# Patient Record
Sex: FEMALE
[De-identification: names, ages, dates, MRNs, and addresses within clinical notes are randomized per-mention and may not be internally consistent; named-entity substitution may affect disease eponyms.]

---

## 2024-06-07 ENCOUNTER — APPOINTMENT (OUTPATIENT)
Dept: ORTHOPEDIC SURGERY | Facility: CLINIC | Age: 29
End: 2024-06-07
Payer: MEDICAID

## 2024-06-07 VITALS — WEIGHT: 140 LBS | BODY MASS INDEX: 21.97 KG/M2 | HEIGHT: 67 IN

## 2024-06-07 DIAGNOSIS — S92.515A NONDISPLACED FRACTURE OF PROXIMAL PHALANX OF LEFT LESSER TOE(S), INITIAL ENCOUNTER FOR CLOSED FRACTURE: ICD-10-CM

## 2024-06-07 PROBLEM — Z00.00 ENCOUNTER FOR PREVENTIVE HEALTH EXAMINATION: Status: ACTIVE | Noted: 2024-06-07

## 2024-06-07 PROCEDURE — 28510 TREATMENT OF TOE FRACTURE: CPT | Mod: LT

## 2024-06-07 PROCEDURE — 99203 OFFICE O/P NEW LOW 30 MIN: CPT | Mod: 57

## 2024-06-07 NOTE — PHYSICAL EXAM
[de-identified] : Fifth toe is tender.  It is swollen.  No significant deformity.  Neurovascular intact distally.

## 2024-06-07 NOTE — DATA REVIEWED
[FreeTextEntry1] : I reviewed the patient's x-rays that were taken at [facility].  X-rays demonstrate a nondisplaced oblique fracture of the fifth proximal phalanx.

## 2024-06-07 NOTE — HISTORY OF PRESENT ILLNESS
[de-identified] : This is a 28-year-old patient comes to me a week after injuring her left fifth toe.  Her fifth toe hit a couch and she felt immediate pain.  X-rays performed at [facility].  Her pain is controlled at this point.  Localized to the fifth toe alone.  No prior trauma.

## 2024-06-07 NOTE — DISCUSSION/SUMMARY
[de-identified] : I discussed the patient's findings with her.  At this we can treat this nonsurgically.  Will initiate fracture care.  She will wear an open toed shoe and avoid impact activity.  I will see her back in 1 month for repeat clinical and radiographic exam.  All questions answered.

## 2024-07-08 ENCOUNTER — APPOINTMENT (OUTPATIENT)
Dept: ORTHOPEDIC SURGERY | Facility: CLINIC | Age: 29
End: 2024-07-08